# Patient Record
Sex: MALE | Employment: FULL TIME
[De-identification: names, ages, dates, MRNs, and addresses within clinical notes are randomized per-mention and may not be internally consistent; named-entity substitution may affect disease eponyms.]

---

## 2017-02-01 ENCOUNTER — CHARTING TRANS (OUTPATIENT)
Dept: OTHER | Age: 27
End: 2017-02-01

## 2017-09-13 ENCOUNTER — HOSPITAL ENCOUNTER (OUTPATIENT)
Age: 27
Discharge: HOME | End: 2017-09-13
Attending: FAMILY MEDICINE
Payer: COMMERCIAL

## 2017-09-13 VITALS
WEIGHT: 130 LBS | HEART RATE: 110 BPM | DIASTOLIC BLOOD PRESSURE: 79 MMHG | SYSTOLIC BLOOD PRESSURE: 111 MMHG | BODY MASS INDEX: 22.2 KG/M2 | TEMPERATURE: 99 F | RESPIRATION RATE: 20 BRPM | HEIGHT: 64 IN | OXYGEN SATURATION: 100 %

## 2017-09-13 DIAGNOSIS — J98.01 ACUTE BRONCHOSPASM: Primary | ICD-10-CM

## 2017-09-13 PROCEDURE — 94640 AIRWAY INHALATION TREATMENT: CPT

## 2017-09-13 PROCEDURE — 99204 OFFICE O/P NEW MOD 45 MIN: CPT

## 2017-09-13 RX ORDER — PREDNISONE 20 MG/1
40 TABLET ORAL ONCE
Status: COMPLETED | OUTPATIENT
Start: 2017-09-13 | End: 2017-09-13

## 2017-09-13 RX ORDER — IPRATROPIUM BROMIDE AND ALBUTEROL SULFATE 2.5; .5 MG/3ML; MG/3ML
3 SOLUTION RESPIRATORY (INHALATION) ONCE
Status: COMPLETED | OUTPATIENT
Start: 2017-09-13 | End: 2017-09-13

## 2017-09-13 RX ORDER — ALBUTEROL SULFATE 90 UG/1
2 AEROSOL, METERED RESPIRATORY (INHALATION) EVERY 4 HOURS PRN
Qty: 1 INHALER | Refills: 0 | Status: SHIPPED | OUTPATIENT
Start: 2017-09-13

## 2017-09-13 RX ORDER — PREDNISONE 20 MG/1
TABLET ORAL
Qty: 8 TABLET | Refills: 0 | Status: SHIPPED | OUTPATIENT
Start: 2017-09-13

## 2017-09-13 NOTE — ED PROVIDER NOTES
Patient Seen in: [facility]    History   Patient presents with:  Dyspnea IDANIA SOB (respiratory)    Stated Complaint: SOB with wheezing started today    HPI    This 60-year-old male presents the office with complaint of shortness conjunctiva normal.  EARS: Tympanic membranes normal, EAC's normal.  NOSE: Turbinates normal, no bleeding noted. PHARYNX:  Mild erythema is noted, no exudates seen, airway patent, uvula midline  NECK:  No cervical lymphadenopathy.  No thyromegaly,  HEART: Breath. CARRY YOUR INHALER WITH YOU. Qty: 1 Inhaler Refills: 0  Associated Diagnoses:Acute bronchospasm    predniSONE 20 MG Oral Tab  Take 2 tablets once daily with breakfast for 4 additional days. Start on 9/14/17.   Qty: 8 tablet Refills: 0  Associated

## 2018-11-05 VITALS
TEMPERATURE: 98.7 F | HEART RATE: 97 BPM | RESPIRATION RATE: 16 BRPM | HEIGHT: 64 IN | WEIGHT: 140 LBS | BODY MASS INDEX: 23.9 KG/M2 | OXYGEN SATURATION: 97 % | SYSTOLIC BLOOD PRESSURE: 106 MMHG | DIASTOLIC BLOOD PRESSURE: 70 MMHG

## 2022-01-10 NOTE — ED INITIAL ASSESSMENT (HPI)
Patient awoke with with chest burning and wheezing this am.No fever noted. Remote h/o exercise induced asthma. Fiance ill with bronchitis.
Event Note